# Patient Record
Sex: FEMALE | Race: WHITE | Employment: UNEMPLOYED | URBAN - METROPOLITAN AREA
[De-identification: names, ages, dates, MRNs, and addresses within clinical notes are randomized per-mention and may not be internally consistent; named-entity substitution may affect disease eponyms.]

---

## 2023-05-15 ENCOUNTER — TELEPHONE (OUTPATIENT)
Dept: NURSING | Facility: CLINIC | Age: 23
End: 2023-05-15
Payer: COMMERCIAL

## 2023-05-15 NOTE — TELEPHONE ENCOUNTER
Rosenda with [facility] billing calling to request a referral for pt. Pt had gallbladder surgery in Oct and this needs to have a referral from PCP in order for this to be covered. Per [employer] [facility] is pt's primary care clinic. Informed her that pt is not established with [facility].     Sofi Pearl, RN, BSN  [facility] Nurse Advisor 9:17 AM 5/15/2023